# Patient Record
Sex: FEMALE | Race: OTHER | URBAN - METROPOLITAN AREA
[De-identification: names, ages, dates, MRNs, and addresses within clinical notes are randomized per-mention and may not be internally consistent; named-entity substitution may affect disease eponyms.]

---

## 2022-01-26 LAB — MAMMOGRAPHY, EXTERNAL: NORMAL

## 2022-05-05 ENCOUNTER — OFFICE VISIT (OUTPATIENT)
Dept: INTERNAL MEDICINE CLINIC | Age: 52
End: 2022-05-05
Payer: OTHER GOVERNMENT

## 2022-05-05 VITALS
RESPIRATION RATE: 16 BRPM | WEIGHT: 162 LBS | DIASTOLIC BLOOD PRESSURE: 82 MMHG | BODY MASS INDEX: 29.81 KG/M2 | SYSTOLIC BLOOD PRESSURE: 126 MMHG | HEART RATE: 81 BPM | TEMPERATURE: 96.8 F | HEIGHT: 62 IN

## 2022-05-05 DIAGNOSIS — N84.0 ENDOMETRIAL POLYP: ICD-10-CM

## 2022-05-05 DIAGNOSIS — E04.1 THYROID NODULE: ICD-10-CM

## 2022-05-05 DIAGNOSIS — E78.00 HIGH CHOLESTEROL: ICD-10-CM

## 2022-05-05 DIAGNOSIS — Z82.49 FAMILY HISTORY OF HEART DISEASE: ICD-10-CM

## 2022-05-05 DIAGNOSIS — Z00.00 ENCOUNTER FOR MEDICAL EXAMINATION TO ESTABLISH CARE: Primary | ICD-10-CM

## 2022-05-05 PROCEDURE — 99204 OFFICE O/P NEW MOD 45 MIN: CPT | Performed by: INTERNAL MEDICINE

## 2022-05-05 RX ORDER — TAMOXIFEN CITRATE 10 MG/1
1 TABLET, FILM COATED ORAL EVERY OTHER DAY
COMMUNITY
Start: 2022-04-15

## 2022-05-05 RX ORDER — IBUPROFEN 800 MG/1
TABLET ORAL
COMMUNITY
Start: 2022-04-25

## 2022-05-05 RX ORDER — ELETRIPTAN HYDROBROMIDE 40 MG/1
TABLET, FILM COATED ORAL
COMMUNITY
Start: 2022-02-03

## 2022-05-05 RX ORDER — LORATADINE 10 MG/1
10 TABLET ORAL AS NEEDED
COMMUNITY

## 2022-05-05 RX ORDER — MELATONIN
COMMUNITY
Start: 2022-02-03

## 2022-05-05 RX ORDER — FUROSEMIDE 20 MG/1
1 TABLET ORAL AS NEEDED
COMMUNITY
Start: 2022-02-03

## 2022-05-05 RX ORDER — ACETAMINOPHEN 500 MG
TABLET ORAL
COMMUNITY

## 2022-05-05 RX ORDER — IBUPROFEN 400 MG/1
400 TABLET ORAL
COMMUNITY

## 2022-05-05 RX ORDER — GLUCOSAMINE/MSM/CHONDROITIN A 500-83-400
TABLET ORAL DAILY
COMMUNITY

## 2022-05-05 RX ORDER — LEVOTHYROXINE SODIUM 125 UG/1
125 TABLET ORAL
COMMUNITY

## 2022-05-05 NOTE — PROGRESS NOTES
Ms. Job Clifford is a new patient who is here to establish care. CC:  Establish Care       HPI:  22-year-old woman  With a history of ductal carcinoma in situ of the left breast, right breast ADH and LCIS status post excisional biopsy and hypothyroidism presenting to establish care. She is followed at [facility] and she is part of the [facility] trial for  Ductal carcinoma in situ left breast  Reviewed chart and her clinical stage is ducatal cTis N0 M0 grade 1-2 ER + RI + DCIS of the left breast spanning >7cm of calcifications and ALH of the right breast with a history of RIGHT breast ADH and LCIS s/p excisional biopsy in 2015 who is receiving tamoxifen as part of the COMET trial, into which she was enrolled in July 2020. She is followed every 6 months at [facility] part of [facility] trial     Recently moved to [place] but will continue with [place]    Has joint pains from tamoxifen and taking glucosamine and it is helping        Hx of hypothyroidism: on levothyroxine 125 mcg   Hashimoto's  Has nodules and reports US done and told benign  Right hand thumb chronic pain - doing OT no medication and no shots all topical icing and uses bracing   Has tendinitis in elbow   decorveynes tenosynovitis     Has step daughter  Had 3 miscarriages    Gyn: last pap year one year ago   Had a vaginal US recently and had endometriosis and hx of polyps in the uterus that were removed in 2014  No hx of pregnancies  Colonoscopy - December 2020 in [facility] patient to bring copy    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   Review of systems:  Constitutional: negative for fever, chills, weight loss, night sweats   Eyes : negative for vision changes, eye pain and discharge  Nose and Throat: negative for tinnitus, sore throat Cardiovascular: negative for chest pain, palpitations and shortness of breath  Respiratory: negative for shortness of breath, cough and wheezing   Gastroinstestinal: negative for abdominal pain, nausea, vomiting, diarrhea, constipation, and blood in the stool  Musculoskeletal: See HPI  Genitourinary: negative for dysuria, nocturia, polyuria and hematuria   Neurologic: Negative for focal weakness, numbness or incoordination  Skin: negative for rash, pruritus  Hematologic: negative for easy bruising      Past Medical History:   Diagnosis Date    Cancer ([facility] [address]     Breast    Hashimoto's disease     Miscarriage     Thyroid disease     Thyroid nodule     Uterine polyp         Past Surgical History:   Procedure Laterality Date    HX BREAST BIOPSY Left     05/2020    HX BREAST LUMPECTOMY      Right 02/2015     HX OTHER SURGICAL  2014    uterus polyps        Allergies   Allergen Reactions    Avocado Extract Swelling    Grapefruit Swelling       Current Outpatient Medications on File Prior to Visit   Medication Sig Dispense Refill    eletriptan (RELPAX) 40 mg tablet       cholecalciferol (VITAMIN D3) (1000 Units /25 mcg) tablet       acetaminophen (TYLENOL) 500 mg tablet Take  by mouth every six (6) hours as needed.  multivit with minerals/lutein (MULTIVITAMIN 50 PLUS PO) Take  by mouth as needed.  glucosamine/msm/chondroitin A (glucosamine HCl-msm-chondroitn) 500- mg tab Take  by mouth daily.  furosemide (LASIX) 20 mg tablet Take 1 Tablet by mouth as needed.  ibuprofen (MOTRIN) 400 mg tablet Take 400 mg by mouth every six (6) hours as needed.  ibuprofen (MOTRIN) 800 mg tablet       loratadine (CLARITIN) 10 mg tablet Take 10 mg by mouth as needed.  tamoxifen (NOLVADEX) 10 mg tablet Take 1 Tablet by mouth every other day.  levothyroxine (Synthroid) 125 mcg tablet Take 125 mcg by mouth Daily (before breakfast).        No current facility-administered medications on file prior to visit. family history includes Breast Cancer (age of onset: 39) in her mother; Colon Cancer (age of onset: 54) in her father; Heart Disease in her brother. Social History     Socioeconomic History    Marital status:      Spouse name: Not on file    Number of children: Not on file    Years of education: Not on file    Highest education level: Not on file   Occupational History    Not on file   Tobacco Use    Smoking status: Never Smoker    Smokeless tobacco: Never Used   Vaping Use    Vaping Use: Not on file   Substance and Sexual Activity    Alcohol use: Not on file    Drug use: Not on file    Sexual activity: Not on file   Other Topics Concern    Not on file   Social History Narrative    Not on file     Social Determinants of Health     Financial Resource Strain:     Difficulty of Paying Living Expenses: Not on file   Food Insecurity:     Worried About Running Out of Food in the Last Year: Not on file    Jen of Food in the Last Year: Not on file   Transportation Needs:     Lack of Transportation (Medical): Not on file    Lack of Transportation (Non-Medical):  Not on file   Physical Activity:     Days of Exercise per Week: Not on file    Minutes of Exercise per Session: Not on file   Stress:     Feeling of Stress : Not on file   Social Connections:     Frequency of Communication with Friends and Family: Not on file    Frequency of Social Gatherings with Friends and Family: Not on file    Attends Latter day Services: Not on file    Active Member of Clubs or Organizations: Not on file    Attends Club or Organization Meetings: Not on file    Marital Status: Not on file   Intimate Partner Violence:     Fear of Current or Ex-Partner: Not on file    Emotionally Abused: Not on file    Physically Abused: Not on file    Sexually Abused: Not on file   Housing Stability:     Unable to Pay for Housing in the Last Year: Not on file    Number of [place] in the Last Year: Not on file    Unstable Housing in the Last Year: Not on file       Visit Vitals  /82   Pulse 81   Temp 96.8 °F (36 °C) (Temporal)   Resp 16   Ht 5' 2\" (1.575 m)   Wt 162 lb (73.5 kg)   BMI 29.63 kg/m²     General:  Well appearing female no acute distress  HEENT:   PERRL,normal conjunctiva. External ear and canals normal, TMs normal.  Hearing normal to voice. Nose without edema or discharge, normal septum. Lips, teeth, gums normal.  Oropharynx: no erythema, no exudates, no lesions, normal tongue. Neck:  Supple. Thyroid normal size, nontender, without nodules. No carotid bruit. No masses or lymphadenopathy  Respiratory: no respiratory distress,  no wheezing, no rhonchi, no rales. No chest wall tenderness. Cardiovascular:  RRR, normal S1S2, no murmur. Gastrointestinal: normal bowel sounds, soft, nontender, without masses. No hepatosplenomegaly. Extremities +2 pulses, no edema, normal sensation   Musculoskeletal:  Normal gait. Normal digits and nails. Normal strength and tone, no atrophy, and no abnormal movement. Skin:  No rash, no lesions, no ulcers. Skin warm, normal turgor, without induration or nodules. Neuro:  A and OX4, fluent speech, cranial nerves normal 2-12. Sensation normal to light touch.   DTR symmetrical  Psych:  Normal affect      No results found for: WBC, WBCLT, HGBPOC, HGB, HGBP, HCTPOC, HCT, PHCT, RBCH, PLT, MCV, HGBEXT, HCTEXT, PLTEXT, HGBEXT, HCTEXT, PLTEXT  No results found for: NA, K, CL, CO2, AGAP, GLU, BUN, CREA, BUCR, GFRAA, GFRNA, CA, GFRAA  No results found for: CHOL, CHOLPOCT, CHOLX, CHLST, CHOLV, HDL, HDLPOC, HDLP, LDL, LDLCPOC, LDLC, DLDLP, VLDLC, VLDL, TGLX, TRIGL, TRIGP, TGLPOCT, CHHD, CHHDX  No results found for: TSH, TSH2, TSH3, TSHP, TSHEXT, TSHEXT  No results found for: HBA1C, SVX7NCRJ, ECE9EYCJ, CTM5GBUT  No results found for: VITD3, Jamarcus Linton, VD3RIA                Assessment and Plan:     60-year-old   woman  With a history of ductal carcinoma in situ of the left breast, right breast ADH and LCIS status post excisional biopsy, history of endometrial polyp and hypothyroidism presenting to establish care. 1. Encounter for medical examination to establish care  Counseled on healthy exercise lives a healthy lifestyle with exercise and healthy diet    2. Endometrial polyp  - REFERRAL TO OBSTETRICS AND GYNECOLOGY    3. Thyroid nodule  Reports a history of thyroid nodule, has not been imaged in quite some time. She also has a history of hypothyroidism Hashimoto's and is currently on levothyroxine 125 mcg daily  - US THYROID/PARATHYROID/SOFT TISS; Future    4. High cholesterol: She has high cholesterol and family history of heart disease therefore I recommend a heart scan  - CT HEART W/O CONT WITH CALCIUM; Future    5. a history of ductal carcinoma in situ of the left breast, right breast ADH and LCIS status post excisional biopsy  She is currently followed at [facility] every 6 months and is part of the comet trial.  She is on tamoxifen  She has not done a lumpectomy. She does regular mammograms with [facility]    Follow-up and Dispositions    · Return in about 6 months (around 11/5/2022) for thyroid nodule.           Justine Hensley MD

## 2022-05-05 NOTE — PROGRESS NOTES
1. \"Have you been to the ER, urgent care clinic since your last visit? Hospitalized since your last visit? \" No    2. \"Have you seen or consulted any other health care providers outside of the [address] [place] [address] since your last visit? \" Yes Reason for visit: Miriam JUSTIN- and Nell Draper [address].     3. For patients aged 39-70: Has the patient had a colonoscopy / FIT/ Cologuard? Yes - Care Gap present. Rooming MA/LPN to request most recent results      If the patient is female:    4. For patients aged 41-77: Has the patient had a mammogram within the past 2 years? Yes - Care Gap present. Rooming MA/LPN to request most recent results      5. For patients aged 21-65: Has the patient had a pap smear? Yes - Care Gap present.  Rooming MA/LPN to request most recent results

## 2022-05-05 NOTE — PATIENT INSTRUCTIONS
Schedule US of the thyroid     Schedule heart scan calcium score    Referral to gyn -see [facility] for referral coordination      Send record of colonoscopy, last labs ( lipids), thyroid records

## 2022-05-06 ENCOUNTER — TELEPHONE (OUTPATIENT)
Dept: INTERNAL MEDICINE CLINIC | Age: 52
End: 2022-05-06

## 2022-05-06 NOTE — TELEPHONE ENCOUNTER
Received referral for GYN for patient.     Called 's office and scheduled patient on Tues 5/10/22 @ 10:15am.    Left detailed message on pts cell notifying her of patient appt date and time

## 2025-06-01 ENCOUNTER — TRANSCRIBE ORDERS (OUTPATIENT)
Facility: HOSPITAL | Age: 55
End: 2025-06-01

## 2025-06-01 DIAGNOSIS — Z01.89 ENCOUNTER FOR OTHER SPECIFIED SPECIAL EXAMINATIONS: Primary | ICD-10-CM
